# Patient Record
Sex: FEMALE | Race: BLACK OR AFRICAN AMERICAN | NOT HISPANIC OR LATINO | URBAN - METROPOLITAN AREA
[De-identification: names, ages, dates, MRNs, and addresses within clinical notes are randomized per-mention and may not be internally consistent; named-entity substitution may affect disease eponyms.]

---

## 2017-09-23 ENCOUNTER — EMERGENCY (EMERGENCY)
Facility: HOSPITAL | Age: 62
LOS: 0 days | Discharge: HOME | End: 2017-09-23
Attending: EMERGENCY MEDICINE
Payer: SELF-PAY

## 2017-09-23 VITALS
SYSTOLIC BLOOD PRESSURE: 118 MMHG | DIASTOLIC BLOOD PRESSURE: 119 MMHG | OXYGEN SATURATION: 97 % | TEMPERATURE: 98 F | HEART RATE: 90 BPM | HEIGHT: 66 IN | WEIGHT: 179.02 LBS | RESPIRATION RATE: 17 BRPM

## 2017-09-23 DIAGNOSIS — R73.9 HYPERGLYCEMIA, UNSPECIFIED: ICD-10-CM

## 2017-09-23 DIAGNOSIS — I10 ESSENTIAL (PRIMARY) HYPERTENSION: ICD-10-CM

## 2017-09-23 DIAGNOSIS — Z91.14 PATIENT'S OTHER NONCOMPLIANCE WITH MEDICATION REGIMEN: ICD-10-CM

## 2017-09-23 DIAGNOSIS — N39.0 URINARY TRACT INFECTION, SITE NOT SPECIFIED: ICD-10-CM

## 2017-09-23 DIAGNOSIS — R53.1 WEAKNESS: ICD-10-CM

## 2017-09-23 LAB
ACETONE SERPL-MCNC: NEGATIVE — SIGNIFICANT CHANGE UP
ALBUMIN SERPL ELPH-MCNC: 4 G/DL — SIGNIFICANT CHANGE UP (ref 3.3–5)
ALP SERPL-CCNC: 124 U/L — HIGH (ref 40–120)
ALT FLD-CCNC: 31 U/L — SIGNIFICANT CHANGE UP (ref 12–78)
ANION GAP SERPL CALC-SCNC: 9 MMOL/L — SIGNIFICANT CHANGE UP (ref 5–17)
APPEARANCE UR: ABNORMAL
AST SERPL-CCNC: 17 U/L — SIGNIFICANT CHANGE UP (ref 15–37)
BASE EXCESS BLDA CALC-SCNC: 2.1 MMOL/L — HIGH (ref -2–2)
BILIRUB SERPL-MCNC: 0.8 MG/DL — SIGNIFICANT CHANGE UP (ref 0.2–1.2)
BILIRUB UR-MCNC: NEGATIVE — SIGNIFICANT CHANGE UP
BLOOD GAS COMMENTS: SIGNIFICANT CHANGE UP
BLOOD GAS COMMENTS: SIGNIFICANT CHANGE UP
BLOOD GAS SOURCE: SIGNIFICANT CHANGE UP
BUN SERPL-MCNC: 12 MG/DL — SIGNIFICANT CHANGE UP (ref 7–23)
CALCIUM SERPL-MCNC: 9.5 MG/DL — SIGNIFICANT CHANGE UP (ref 8.5–10.1)
CHLORIDE SERPL-SCNC: 102 MMOL/L — SIGNIFICANT CHANGE UP (ref 96–108)
CO2 SERPL-SCNC: 28 MMOL/L — SIGNIFICANT CHANGE UP (ref 22–31)
COLOR SPEC: YELLOW — SIGNIFICANT CHANGE UP
CREAT SERPL-MCNC: 0.89 MG/DL — SIGNIFICANT CHANGE UP (ref 0.5–1.3)
DIFF PNL FLD: ABNORMAL
GLUCOSE SERPL-MCNC: 326 MG/DL — HIGH (ref 70–99)
GLUCOSE UR QL: 1000 MG/DL
HCO3 BLDA-SCNC: 26 MMOL/L — SIGNIFICANT CHANGE UP (ref 21–29)
HCT VFR BLD CALC: 45.8 % — HIGH (ref 34.5–45)
HGB BLD-MCNC: 14.5 G/DL — SIGNIFICANT CHANGE UP (ref 11.5–15.5)
HOROWITZ INDEX BLDA+IHG-RTO: 21 — SIGNIFICANT CHANGE UP
KETONES UR-MCNC: NEGATIVE — SIGNIFICANT CHANGE UP
LEUKOCYTE ESTERASE UR-ACNC: ABNORMAL
MCHC RBC-ENTMCNC: 26.1 PG — LOW (ref 27–34)
MCHC RBC-ENTMCNC: 31.6 GM/DL — LOW (ref 32–36)
MCV RBC AUTO: 82.7 FL — SIGNIFICANT CHANGE UP (ref 80–100)
NITRITE UR-MCNC: NEGATIVE — SIGNIFICANT CHANGE UP
PCO2 BLDA: 38 MMHG — SIGNIFICANT CHANGE UP (ref 32–46)
PH BLD: 7.44 — SIGNIFICANT CHANGE UP (ref 7.35–7.45)
PH UR: 5 — SIGNIFICANT CHANGE UP (ref 5–8)
PLATELET # BLD AUTO: 297 K/UL — SIGNIFICANT CHANGE UP (ref 150–400)
PO2 BLDA: 82 MMHG — SIGNIFICANT CHANGE UP (ref 74–108)
POTASSIUM SERPL-MCNC: 4.1 MMOL/L — SIGNIFICANT CHANGE UP (ref 3.5–5.3)
POTASSIUM SERPL-SCNC: 4.1 MMOL/L — SIGNIFICANT CHANGE UP (ref 3.5–5.3)
PROT SERPL-MCNC: 8.2 GM/DL — SIGNIFICANT CHANGE UP (ref 6–8.3)
PROT UR-MCNC: NEGATIVE MG/DL — SIGNIFICANT CHANGE UP
RBC # BLD: 5.54 M/UL — HIGH (ref 3.8–5.2)
RBC # FLD: 13.5 % — SIGNIFICANT CHANGE UP (ref 11–15)
SAO2 % BLDA: 97 % — HIGH (ref 92–96)
SODIUM SERPL-SCNC: 139 MMOL/L — SIGNIFICANT CHANGE UP (ref 135–145)
SP GR SPEC: 1.01 — SIGNIFICANT CHANGE UP (ref 1.01–1.02)
UROBILINOGEN FLD QL: NEGATIVE MG/DL — SIGNIFICANT CHANGE UP
WBC # BLD: 7 K/UL — SIGNIFICANT CHANGE UP (ref 3.8–10.5)
WBC # FLD AUTO: 7 K/UL — SIGNIFICANT CHANGE UP (ref 3.8–10.5)

## 2017-09-23 PROCEDURE — 99284 EMERGENCY DEPT VISIT MOD MDM: CPT

## 2017-09-23 RX ORDER — METFORMIN HYDROCHLORIDE 850 MG/1
1 TABLET ORAL
Qty: 14 | Refills: 0 | OUTPATIENT
Start: 2017-09-23 | End: 2017-09-30

## 2017-09-23 RX ORDER — SODIUM CHLORIDE 9 MG/ML
2000 INJECTION INTRAMUSCULAR; INTRAVENOUS; SUBCUTANEOUS ONCE
Qty: 0 | Refills: 0 | Status: COMPLETED | OUTPATIENT
Start: 2017-09-23 | End: 2017-09-23

## 2017-09-23 RX ORDER — AZTREONAM 2 G
1 VIAL (EA) INJECTION
Qty: 14 | Refills: 0 | OUTPATIENT
Start: 2017-09-23 | End: 2017-09-30

## 2017-09-23 RX ADMIN — Medication 1 TABLET(S): at 18:04

## 2017-09-23 RX ADMIN — SODIUM CHLORIDE 2000 MILLILITER(S): 9 INJECTION INTRAMUSCULAR; INTRAVENOUS; SUBCUTANEOUS at 15:56

## 2017-09-23 NOTE — ED PROVIDER NOTE - PROGRESS NOTE DETAILS
Results reported to patient--hyperglycemia, UTI, sent meds to pharmacy  Pt. reports feeling better after meds  pt. agrees to f/u with primary care outpt.  pt. understands to return to ED if symptoms worsen; will d/c

## 2017-09-23 NOTE — ED PROVIDER NOTE - PHYSICAL EXAMINATION
Vitals: WNL  Gen: AAOx3, NAD, sitting comfortably in stretcher, non toxic  Head: ncat, perrla, eomi b/l  Neck: supple, no lymphadenopathy, no midline deviation  Heart: rrr, no m/r/g  Lungs: CTA b/l, no rales/ronchi/wheezes  Abd: soft, nontender, non-distended, no rebound or guarding  Ext: no clubbing/cyanosis/edema  Neuro: sensation and muscle strength intact b/l, steady gait

## 2017-09-23 NOTE — ED PROVIDER NOTE - MEDICAL DECISION MAKING DETAILS
62 yo F with hyperglycemia, r/o DKA  -basic labs, ekg, finger sticks hourly, NS bolus 2L, abg, ketones, ua ,cx  -f/u results, insulin as needed when labs return

## 2017-09-23 NOTE — ED PROVIDER NOTE - OBJECTIVE STATEMENT
60 yo F with tiredness and weakness.  Her son is an EMS driver and decided to check her sugar today and it was 450 at home.  Pt. has no complaints.  Son explains she's in denial about her medical problems.  She's also been told she has HTN and HLD, but she's non-compliant with meds.  When asked, she admits to frequency.   ROS: negative for fever, cough, headache, chest pain, shortness of breath, abd pain, nausea, vomiting, diarrhea, rash, paresthesia, and weakness.   PMH: HTN, HLD; Meds: Denies; SH: Denies smoking/drinking/drug use

## 2017-09-23 NOTE — ED ADULT NURSE NOTE - OBJECTIVE STATEMENT
Patient alert, stating that she took her blood sugar 458 at home before she ate, also her blood pressure was high at home, above 160. Stating that she has been urinating frequently, that she is thirsty all the time, and that her vision is blurry. Patient states she feels like her vaginal area is itchy too. Son at bedside. Patient states she lost a lot of weight recently. Patient alert, stating that she took her blood sugar 458 at home before she ate, also her blood pressure was high at home, above 160. Stating that she has been urinating frequently, that she is thirsty all the time, and that her vision is blurry. Patient states she feels like her vaginal area is itchy too. Son at bedside. Patient states she lost a lot of weight recently. Takes no medication at home, has no prior medical or surgical history as per patient.

## 2017-09-24 LAB
CULTURE RESULTS: SIGNIFICANT CHANGE UP
SPECIMEN SOURCE: SIGNIFICANT CHANGE UP

## 2019-06-23 ENCOUNTER — EMERGENCY (EMERGENCY)
Facility: HOSPITAL | Age: 64
LOS: 1 days | Discharge: HOME | End: 2019-06-23
Attending: EMERGENCY MEDICINE | Admitting: EMERGENCY MEDICINE
Payer: MEDICAID

## 2019-06-23 VITALS
HEART RATE: 84 BPM | RESPIRATION RATE: 18 BRPM | OXYGEN SATURATION: 100 % | DIASTOLIC BLOOD PRESSURE: 86 MMHG | SYSTOLIC BLOOD PRESSURE: 149 MMHG | TEMPERATURE: 99 F

## 2019-06-23 LAB
ALBUMIN SERPL ELPH-MCNC: 4.4 G/DL — SIGNIFICANT CHANGE UP (ref 3.3–5)
ALP SERPL-CCNC: 97 U/L — SIGNIFICANT CHANGE UP (ref 40–120)
ALT FLD-CCNC: 18 U/L — SIGNIFICANT CHANGE UP (ref 4–33)
ANION GAP SERPL CALC-SCNC: 13 MMO/L — SIGNIFICANT CHANGE UP (ref 7–14)
APPEARANCE UR: CLEAR — SIGNIFICANT CHANGE UP
AST SERPL-CCNC: 10 U/L — SIGNIFICANT CHANGE UP (ref 4–32)
B-OH-BUTYR SERPL-SCNC: 0 MMOL/L — SIGNIFICANT CHANGE UP (ref 0–0.4)
BASE EXCESS BLDV CALC-SCNC: 3.5 MMOL/L — SIGNIFICANT CHANGE UP
BASOPHILS # BLD AUTO: 0.03 K/UL — SIGNIFICANT CHANGE UP (ref 0–0.2)
BASOPHILS NFR BLD AUTO: 0.4 % — SIGNIFICANT CHANGE UP (ref 0–2)
BILIRUB SERPL-MCNC: 0.4 MG/DL — SIGNIFICANT CHANGE UP (ref 0.2–1.2)
BILIRUB UR-MCNC: NEGATIVE — SIGNIFICANT CHANGE UP
BLOOD GAS VENOUS - CREATININE: 0.67 MG/DL — SIGNIFICANT CHANGE UP (ref 0.5–1.3)
BLOOD GAS VENOUS - FIO2: 21 — SIGNIFICANT CHANGE UP
BLOOD UR QL VISUAL: NEGATIVE — SIGNIFICANT CHANGE UP
BUN SERPL-MCNC: 14 MG/DL — SIGNIFICANT CHANGE UP (ref 7–23)
CALCIUM SERPL-MCNC: 10.2 MG/DL — SIGNIFICANT CHANGE UP (ref 8.4–10.5)
CHLORIDE BLDV-SCNC: 104 MMOL/L — SIGNIFICANT CHANGE UP (ref 96–108)
CHLORIDE SERPL-SCNC: 98 MMOL/L — SIGNIFICANT CHANGE UP (ref 98–107)
CO2 SERPL-SCNC: 25 MMOL/L — SIGNIFICANT CHANGE UP (ref 22–31)
COLOR SPEC: SIGNIFICANT CHANGE UP
CREAT SERPL-MCNC: 0.66 MG/DL — SIGNIFICANT CHANGE UP (ref 0.5–1.3)
EOSINOPHIL # BLD AUTO: 0.05 K/UL — SIGNIFICANT CHANGE UP (ref 0–0.5)
EOSINOPHIL NFR BLD AUTO: 0.6 % — SIGNIFICANT CHANGE UP (ref 0–6)
GAS PNL BLDV: 135 MMOL/L — LOW (ref 136–146)
GLUCOSE BLDV-MCNC: 484 MG/DL — CRITICAL HIGH (ref 70–99)
GLUCOSE SERPL-MCNC: 523 MG/DL — CRITICAL HIGH (ref 70–99)
GLUCOSE UR-MCNC: >1000 — HIGH
HBA1C BLD-MCNC: 11.3 % — HIGH (ref 4–5.6)
HCO3 BLDV-SCNC: 26 MMOL/L — SIGNIFICANT CHANGE UP (ref 20–27)
HCT VFR BLD CALC: 41.5 % — SIGNIFICANT CHANGE UP (ref 34.5–45)
HCT VFR BLDV CALC: 41.3 % — SIGNIFICANT CHANGE UP (ref 34.5–45)
HGB BLD-MCNC: 12.9 G/DL — SIGNIFICANT CHANGE UP (ref 11.5–15.5)
HGB BLDV-MCNC: 13.4 G/DL — SIGNIFICANT CHANGE UP (ref 11.5–15.5)
IMM GRANULOCYTES NFR BLD AUTO: 0.5 % — SIGNIFICANT CHANGE UP (ref 0–1.5)
KETONES UR-MCNC: NEGATIVE — SIGNIFICANT CHANGE UP
LACTATE BLDV-MCNC: 2 MMOL/L — SIGNIFICANT CHANGE UP (ref 0.5–2)
LEUKOCYTE ESTERASE UR-ACNC: NEGATIVE — SIGNIFICANT CHANGE UP
LYMPHOCYTES # BLD AUTO: 2.07 K/UL — SIGNIFICANT CHANGE UP (ref 1–3.3)
LYMPHOCYTES # BLD AUTO: 26.1 % — SIGNIFICANT CHANGE UP (ref 13–44)
MCHC RBC-ENTMCNC: 25.2 PG — LOW (ref 27–34)
MCHC RBC-ENTMCNC: 31.1 % — LOW (ref 32–36)
MCV RBC AUTO: 81.2 FL — SIGNIFICANT CHANGE UP (ref 80–100)
MONOCYTES # BLD AUTO: 0.42 K/UL — SIGNIFICANT CHANGE UP (ref 0–0.9)
MONOCYTES NFR BLD AUTO: 5.3 % — SIGNIFICANT CHANGE UP (ref 2–14)
NEUTROPHILS # BLD AUTO: 5.31 K/UL — SIGNIFICANT CHANGE UP (ref 1.8–7.4)
NEUTROPHILS NFR BLD AUTO: 67.1 % — SIGNIFICANT CHANGE UP (ref 43–77)
NITRITE UR-MCNC: NEGATIVE — SIGNIFICANT CHANGE UP
NRBC # FLD: 0 K/UL — SIGNIFICANT CHANGE UP (ref 0–0)
PCO2 BLDV: 50 MMHG — SIGNIFICANT CHANGE UP (ref 41–51)
PH BLDV: 7.37 PH — SIGNIFICANT CHANGE UP (ref 7.32–7.43)
PH UR: 7 — SIGNIFICANT CHANGE UP (ref 5–8)
PLATELET # BLD AUTO: 292 K/UL — SIGNIFICANT CHANGE UP (ref 150–400)
PMV BLD: 12.7 FL — SIGNIFICANT CHANGE UP (ref 7–13)
PO2 BLDV: 35 MMHG — SIGNIFICANT CHANGE UP (ref 35–40)
POTASSIUM BLDV-SCNC: 4.1 MMOL/L — SIGNIFICANT CHANGE UP (ref 3.4–4.5)
POTASSIUM SERPL-MCNC: 4.3 MMOL/L — SIGNIFICANT CHANGE UP (ref 3.5–5.3)
POTASSIUM SERPL-SCNC: 4.3 MMOL/L — SIGNIFICANT CHANGE UP (ref 3.5–5.3)
PROT SERPL-MCNC: 7.5 G/DL — SIGNIFICANT CHANGE UP (ref 6–8.3)
PROT UR-MCNC: NEGATIVE — SIGNIFICANT CHANGE UP
RBC # BLD: 5.11 M/UL — SIGNIFICANT CHANGE UP (ref 3.8–5.2)
RBC # FLD: 13.5 % — SIGNIFICANT CHANGE UP (ref 10.3–14.5)
SAO2 % BLDV: 62.9 % — SIGNIFICANT CHANGE UP (ref 60–85)
SODIUM SERPL-SCNC: 136 MMOL/L — SIGNIFICANT CHANGE UP (ref 135–145)
SP GR SPEC: 1.03 — SIGNIFICANT CHANGE UP (ref 1–1.04)
UROBILINOGEN FLD QL: NORMAL — SIGNIFICANT CHANGE UP
WBC # BLD: 7.92 K/UL — SIGNIFICANT CHANGE UP (ref 3.8–10.5)
WBC # FLD AUTO: 7.92 K/UL — SIGNIFICANT CHANGE UP (ref 3.8–10.5)

## 2019-06-23 PROCEDURE — 99220: CPT

## 2019-06-23 RX ORDER — DEXTROSE 50 % IN WATER 50 %
15 SYRINGE (ML) INTRAVENOUS ONCE
Refills: 0 | Status: DISCONTINUED | OUTPATIENT
Start: 2019-06-23 | End: 2019-06-27

## 2019-06-23 RX ORDER — DEXTROSE 50 % IN WATER 50 %
12.5 SYRINGE (ML) INTRAVENOUS ONCE
Refills: 0 | Status: DISCONTINUED | OUTPATIENT
Start: 2019-06-23 | End: 2019-06-27

## 2019-06-23 RX ORDER — INSULIN LISPRO 100/ML
VIAL (ML) SUBCUTANEOUS
Refills: 0 | Status: DISCONTINUED | OUTPATIENT
Start: 2019-06-23 | End: 2019-06-27

## 2019-06-23 RX ORDER — INSULIN LISPRO 100/ML
6 VIAL (ML) SUBCUTANEOUS
Refills: 0 | Status: DISCONTINUED | OUTPATIENT
Start: 2019-06-23 | End: 2019-06-27

## 2019-06-23 RX ORDER — DEXTROSE 50 % IN WATER 50 %
25 SYRINGE (ML) INTRAVENOUS ONCE
Refills: 0 | Status: DISCONTINUED | OUTPATIENT
Start: 2019-06-23 | End: 2019-06-27

## 2019-06-23 RX ORDER — LOSARTAN POTASSIUM 100 MG/1
50 TABLET, FILM COATED ORAL DAILY
Refills: 0 | Status: DISCONTINUED | OUTPATIENT
Start: 2019-06-23 | End: 2019-06-27

## 2019-06-23 RX ORDER — SODIUM CHLORIDE 9 MG/ML
1000 INJECTION INTRAMUSCULAR; INTRAVENOUS; SUBCUTANEOUS ONCE
Refills: 0 | Status: COMPLETED | OUTPATIENT
Start: 2019-06-23 | End: 2019-06-23

## 2019-06-23 RX ORDER — SODIUM CHLORIDE 9 MG/ML
1000 INJECTION, SOLUTION INTRAVENOUS
Refills: 0 | Status: DISCONTINUED | OUTPATIENT
Start: 2019-06-23 | End: 2019-06-27

## 2019-06-23 RX ORDER — METFORMIN HYDROCHLORIDE 850 MG/1
1000 TABLET ORAL
Refills: 0 | Status: DISCONTINUED | OUTPATIENT
Start: 2019-06-23 | End: 2019-06-27

## 2019-06-23 RX ORDER — INSULIN LISPRO 100/ML
VIAL (ML) SUBCUTANEOUS AT BEDTIME
Refills: 0 | Status: DISCONTINUED | OUTPATIENT
Start: 2019-06-23 | End: 2019-06-27

## 2019-06-23 RX ORDER — GLUCAGON INJECTION, SOLUTION 0.5 MG/.1ML
1 INJECTION, SOLUTION SUBCUTANEOUS ONCE
Refills: 0 | Status: DISCONTINUED | OUTPATIENT
Start: 2019-06-23 | End: 2019-06-27

## 2019-06-23 RX ORDER — INSULIN GLARGINE 100 [IU]/ML
18 INJECTION, SOLUTION SUBCUTANEOUS AT BEDTIME
Refills: 0 | Status: DISCONTINUED | OUTPATIENT
Start: 2019-06-23 | End: 2019-06-27

## 2019-06-23 RX ORDER — SODIUM CHLORIDE 9 MG/ML
1000 INJECTION INTRAMUSCULAR; INTRAVENOUS; SUBCUTANEOUS
Refills: 0 | Status: DISCONTINUED | OUTPATIENT
Start: 2019-06-23 | End: 2019-06-27

## 2019-06-23 RX ORDER — SIMVASTATIN 20 MG/1
20 TABLET, FILM COATED ORAL AT BEDTIME
Refills: 0 | Status: DISCONTINUED | OUTPATIENT
Start: 2019-06-23 | End: 2019-06-27

## 2019-06-23 RX ADMIN — Medication 3: at 18:29

## 2019-06-23 RX ADMIN — LOSARTAN POTASSIUM 50 MILLIGRAM(S): 100 TABLET, FILM COATED ORAL at 18:28

## 2019-06-23 RX ADMIN — Medication 1 APPLICATION(S): at 18:58

## 2019-06-23 RX ADMIN — SIMVASTATIN 20 MILLIGRAM(S): 20 TABLET, FILM COATED ORAL at 22:04

## 2019-06-23 RX ADMIN — Medication 6 UNIT(S): at 18:30

## 2019-06-23 RX ADMIN — INSULIN GLARGINE 18 UNIT(S): 100 INJECTION, SOLUTION SUBCUTANEOUS at 22:04

## 2019-06-23 RX ADMIN — SODIUM CHLORIDE 100 MILLILITER(S): 9 INJECTION INTRAMUSCULAR; INTRAVENOUS; SUBCUTANEOUS at 18:30

## 2019-06-23 RX ADMIN — METFORMIN HYDROCHLORIDE 1000 MILLIGRAM(S): 850 TABLET ORAL at 18:28

## 2019-06-23 RX ADMIN — SODIUM CHLORIDE 1000 MILLILITER(S): 9 INJECTION INTRAMUSCULAR; INTRAVENOUS; SUBCUTANEOUS at 15:14

## 2019-06-23 RX ADMIN — SODIUM CHLORIDE 1000 MILLILITER(S): 9 INJECTION INTRAMUSCULAR; INTRAVENOUS; SUBCUTANEOUS at 16:47

## 2019-06-23 NOTE — ED PROVIDER NOTE - OBJECTIVE STATEMENT
62 yo F c PMH of IDDM, HTN, HLD p/w 2 week h/o of polyuria, took blood sugar was in 400s today, states she is non-compliant with DM meds "because sometimes I forget." home meds: metformin 1000 mg bid, glargine 30 u qhs.

## 2019-06-23 NOTE — ED PROVIDER NOTE - ATTENDING CONTRIBUTION TO CARE
Attending Statement: I have personally seen and examined this patient. I have fully participated in the care of this patient. I have reviewed all pertinent clinical information, including history physical exam, plan and the Resident's note and agree except as noted  62yo F hx of IDDM, HTN, HLD, pw polyuria and "not feeling ok" pt admits to being non adherent to meds for DM, pt prescribed metformin and glargine. FS has been "over 400" last two days. Denies polydipsia Denies cp/sob/abdominal pain. no vomit or diarrhea no dysuria or hematuria no back pain  no fever  Vital signs noted. sitting in chair no distress. EOMI. mmm. normal S1-S2 No resp distress. able to speak in full and clear sentences. no wheeze, rales or stridor. soft nontender abdomen. no  rebound. no guarding. no sign of trauma. no CVAT no pedal edema. no calf tenderness. normal pulses bilateral feet.   plan labs, ekg, urine, CDU/admissioin

## 2019-06-23 NOTE — ED CDU PROVIDER INITIAL DAY NOTE - PHYSICAL EXAMINATION
Vital signs reviewed.   CONSTITUTIONAL: Well-appearing; well-nourished; in no apparent distress. Non-toxic appearing.   HEAD: Normocephalic, atraumatic.  EYES: PERRL, EOM intact, conjunctiva and sclera WNL.  ENT: normal nose; no rhinorrhea;   NECK: Trachea midline   CARD: Normal S1, S2; no murmurs, rubs, or gallops noted.  RESP: Normal chest excursion with respiration; breath sounds clear and equal bilaterally; no wheezes, rhonchi, or rales.  EXT/MS: moves all extremities; distal pulses are normal, no pedal edema.  SKIN: Normal for age and race; warm; dry; good turgor; +b/l candidal appearing rash noted underneath both breast.   NEURO: Awake, alert, oriented x 3, no gross deficits, CN II-XII grossly intact, no motor or sensory deficit noted. No pronator drift. No slurred speech or facial droop.   PSYCH: Normal mood; appropriate affect.

## 2019-06-23 NOTE — ED PROVIDER NOTE - CLINICAL SUMMARY MEDICAL DECISION MAKING FREE TEXT BOX
64 yo F c PMH of IDDM, HTN, HLD p/w 2 week h/o of polyuria, took blood sugar was in 400s today, states she is non-compliant with DM meds "because sometimes I forget." On exam, /83 VS otherwise wnl, no remarkable exam findings. Hyperglycemia to 500s downtrending s/p IVF. pt states she is willing to go to CDU to learn how to use insulin. will observe to cdu for DM education A1c 11

## 2019-06-23 NOTE — ED CDU PROVIDER INITIAL DAY NOTE - OBJECTIVE STATEMENT
HPI: Patient is a 63 y.o female with PMHx of HTN, HLD, DM (on metformin, glimeperide, Basaglar) who presents to ED c/o dizziness/feeling lightheaded over past week, noted elevated BG at home in 400's. As per patient states that over the past week she has intermittent dizziness described as room spinning, admits today she was with her son and checked FS at home and noted BG to being in 400's. Pt admits she was started on Basaglar 1-2 years ago, suppose to give 30 units at bedtime. Pt also on PO metformin and amaryl. Pt admits that her current insulin is  and that she sometimes misses her dose of medications. Pt recently seen at [facility] and was set up with Endocrinology outpatient appointment for  with Dr. Worrell. Pt states currently BG being monitored by PCP Dr. Dennis Mario. Pt denies changes in vision, headaches, Pt seen and worked up in ED,  and FS in 400's. Pt given fluids with BG trending to 300's. HA1C 11.3. Pt Transferred to CDU for; BG monitoring, ISS, fluids, Endo consult, vitals q4 and frequent re-eval. HPI: Patient is a 63 y.o female with PMHx of HTN, HLD, DM (on metformin, glimeperide, Basaglar) who presents to ED c/o dizziness/feeling lightheaded over past week, noted elevated BG at home in 400's. As per patient states that over the past week she has intermittent dizziness described as room spinning, admits today she was with her son and checked FS at home and noted BG to being in 400's. Pt admits she was started on Basaglar 1-2 years ago, suppose to give 30 units at bedtime. Pt also on PO metformin and amaryl. Pt admits that her current insulin is  and that she sometimes misses her dose of medications. Pt recently seen at [facility] and was set up with Endocrinology outpatient appointment for  with Dr. Worrell. Pt states currently BG being monitored by PCP Dr. Dennis Mario. Pt denies changes in vision, headaches, chest pain, shortness of breath, numbness or tingling, weakness, neck pain, fever, or any other complaints. Pt seen and worked up in ED,  and FS in 400's. Pt given fluids with BG trending to 300's. HA1C 11.3. Pt Transferred to CDU for; BG monitoring, ISS, fluids, Endo consult, vitals q4 and frequent re-eval.

## 2019-06-23 NOTE — ED ADULT NURSE NOTE - OBJECTIVE STATEMENT
Pt presents to ED with dizziness, weakness, high blood sugar, and urinary frequency. Pt AxOx3, ambulatory. Pt denies chest pain, lightheadedness and shortness of breath. breathing even and unlabored. Pt states she took her blood sugar and saw it was in the 400's. Pt states she has been feeling very thirsty and always has to go to the bathroom. pt noncomplaint with insulin. pt states she takes 30units at night but does not always take it because she does not like to take her blood sugar. Pt denies abd pain, n/v/d. Pt skin clean dry and intact. 20G IVL placed in the R AC. Will continue to monitor.

## 2019-06-23 NOTE — ED ADULT TRIAGE NOTE - CHIEF COMPLAINT QUOTE
reports increased tiredness,increased urination x 4 days.fs over 400 today. not taking meds regularly   fs 475

## 2019-06-23 NOTE — ED CDU PROVIDER INITIAL DAY NOTE - DETAILS
Patient is a 63 y.o female with PMHx of HTN, HLD, DM (on metformin, glimeperide, lantus) who presents to ED c/o dizziness/feeling lightheaded over past week, noted elevated BG at home in 400's. Pt seen and worked up in ED,  and FS in 400's. Pt given fluids with BG trending to 300's. HA1C 11.3. Pt Transferred to CDU for; BG monitoring, ISS, fluids, Endo consult, vitals q4 and frequent re-eval.

## 2019-06-23 NOTE — ED CDU PROVIDER INITIAL DAY NOTE - ATTENDING CONTRIBUTION TO CARE
Dr Bloch, ATTENDING MD-  I performed a face to face bedside interview with patient regarding history of present illness, review of symptoms and past medical history. I completed an independent physical exam.  I have discussed patient's plan of care with PA.   Documentation as above in the note.  Patient well appearing obese female NAD HEENT nml heart sounds nml lungs clear, abd soft nontender, no edema, pulses present. Neuro exam nonfocal , alert and oriented, motor , sensory, gait intact

## 2019-06-23 NOTE — ED ADULT NURSE NOTE - NSIMPLEMENTINTERV_GEN_ALL_ED
Implemented All Universal Safety Interventions:  [place] to call system. Call bell, personal items and telephone within reach. Instruct patient to call for assistance. Room bathroom lighting operational. Non-slip footwear when patient is off stretcher. Physically safe environment: no spills, clutter or unnecessary equipment. Stretcher in lowest position, wheels locked, appropriate side rails in place.

## 2019-06-24 VITALS
OXYGEN SATURATION: 100 % | HEART RATE: 86 BPM | RESPIRATION RATE: 16 BRPM | TEMPERATURE: 98 F | DIASTOLIC BLOOD PRESSURE: 84 MMHG | SYSTOLIC BLOOD PRESSURE: 168 MMHG

## 2019-06-24 DIAGNOSIS — I10 ESSENTIAL (PRIMARY) HYPERTENSION: ICD-10-CM

## 2019-06-24 DIAGNOSIS — E78.5 HYPERLIPIDEMIA, UNSPECIFIED: ICD-10-CM

## 2019-06-24 DIAGNOSIS — E11.65 TYPE 2 DIABETES MELLITUS WITH HYPERGLYCEMIA: ICD-10-CM

## 2019-06-24 LAB
ANION GAP SERPL CALC-SCNC: 9 MMO/L — SIGNIFICANT CHANGE UP (ref 7–14)
BASE EXCESS BLDV CALC-SCNC: 2.9 MMOL/L — SIGNIFICANT CHANGE UP
BLOOD GAS VENOUS - CREATININE: 0.64 MG/DL — SIGNIFICANT CHANGE UP (ref 0.5–1.3)
BLOOD GAS VENOUS - FIO2: 21 — SIGNIFICANT CHANGE UP
BUN SERPL-MCNC: 10 MG/DL — SIGNIFICANT CHANGE UP (ref 7–23)
CALCIUM SERPL-MCNC: 8.8 MG/DL — SIGNIFICANT CHANGE UP (ref 8.4–10.5)
CHLORIDE BLDV-SCNC: 112 MMOL/L — HIGH (ref 96–108)
CHLORIDE SERPL-SCNC: 108 MMOL/L — HIGH (ref 98–107)
CHOLEST SERPL-MCNC: 129 MG/DL — SIGNIFICANT CHANGE UP (ref 120–199)
CO2 SERPL-SCNC: 27 MMOL/L — SIGNIFICANT CHANGE UP (ref 22–31)
CREAT SERPL-MCNC: 0.61 MG/DL — SIGNIFICANT CHANGE UP (ref 0.5–1.3)
GAS PNL BLDV: 138 MMOL/L — SIGNIFICANT CHANGE UP (ref 136–146)
GLUCOSE BLDV-MCNC: 138 MG/DL — HIGH (ref 70–99)
GLUCOSE SERPL-MCNC: 149 MG/DL — HIGH (ref 70–99)
HCO3 BLDV-SCNC: 26 MMOL/L — SIGNIFICANT CHANGE UP (ref 20–27)
HCT VFR BLDV CALC: 37.5 % — SIGNIFICANT CHANGE UP (ref 34.5–45)
HDLC SERPL-MCNC: 42 MG/DL — LOW (ref 45–65)
HGB BLDV-MCNC: 12.2 G/DL — SIGNIFICANT CHANGE UP (ref 11.5–15.5)
LACTATE BLDV-MCNC: 1.4 MMOL/L — SIGNIFICANT CHANGE UP (ref 0.5–2)
LIPID PNL WITH DIRECT LDL SERPL: 83 MG/DL — SIGNIFICANT CHANGE UP
PCO2 BLDV: 49 MMHG — SIGNIFICANT CHANGE UP (ref 41–51)
PH BLDV: 7.37 PH — SIGNIFICANT CHANGE UP (ref 7.32–7.43)
PO2 BLDV: 35 MMHG — SIGNIFICANT CHANGE UP (ref 35–40)
POTASSIUM BLDV-SCNC: 3.5 MMOL/L — SIGNIFICANT CHANGE UP (ref 3.4–4.5)
POTASSIUM SERPL-MCNC: 3.8 MMOL/L — SIGNIFICANT CHANGE UP (ref 3.5–5.3)
POTASSIUM SERPL-SCNC: 3.8 MMOL/L — SIGNIFICANT CHANGE UP (ref 3.5–5.3)
SAO2 % BLDV: 62.5 % — SIGNIFICANT CHANGE UP (ref 60–85)
SODIUM SERPL-SCNC: 144 MMOL/L — SIGNIFICANT CHANGE UP (ref 135–145)
TRIGL SERPL-MCNC: 81 MG/DL — SIGNIFICANT CHANGE UP (ref 10–149)

## 2019-06-24 PROCEDURE — 99217: CPT

## 2019-06-24 PROCEDURE — 99284 EMERGENCY DEPT VISIT MOD MDM: CPT

## 2019-06-24 RX ORDER — INSULIN ASPART 100 [IU]/ML
14 INJECTION, SUSPENSION SUBCUTANEOUS
Qty: 5 | Refills: 0
Start: 2019-06-24 | End: 2019-07-23

## 2019-06-24 RX ORDER — INSULIN ASPART 100 [IU]/ML
26 INJECTION, SUSPENSION SUBCUTANEOUS
Qty: 5 | Refills: 0
Start: 2019-06-24

## 2019-06-24 RX ORDER — METFORMIN HYDROCHLORIDE 850 MG/1
1 TABLET ORAL
Qty: 60 | Refills: 0
Start: 2019-06-24 | End: 2019-07-23

## 2019-06-24 RX ADMIN — LOSARTAN POTASSIUM 50 MILLIGRAM(S): 100 TABLET, FILM COATED ORAL at 06:05

## 2019-06-24 RX ADMIN — Medication 6 UNIT(S): at 13:26

## 2019-06-24 RX ADMIN — Medication 1 APPLICATION(S): at 06:05

## 2019-06-24 RX ADMIN — METFORMIN HYDROCHLORIDE 1000 MILLIGRAM(S): 850 TABLET ORAL at 10:32

## 2019-06-24 RX ADMIN — Medication 6 UNIT(S): at 09:23

## 2019-06-24 RX ADMIN — Medication 2: at 13:25

## 2019-06-24 NOTE — CONSULT NOTE ADULT - PROBLEM SELECTOR RECOMMENDATION 9
- Goal A1c is 6.5% and FS  at home  - Recommend Lantus 18 and Humalog 6 TID AC and low scales INPATIENT  - Recommend RD consultation    Outpatient Plan  - Patient admits to non adherence to meds/insulin due to cumbersome schedule  - Given uncontrolled FS and high A1c, she needs basal/mealtime insulin but unable to do 4 insulin injections daily due to workload, burden of injections and FS  - Recommend Metformin 1000 mg BID  - Recommend Novolog 70/30 OR Humalog 75/25 -- 26 units before breakfast and 14 units before dinner. Advised patient to check FS before each insulin injection  - Recommend D/C Basaglar and Glimepiride  - We had a lengthy discussion regarding consistent carb diet and eliminating juice unless FS<70 when she must drink only 1/2 cup juice  - F/u with PMD and Dr. Worrell as scheduled  - Advised to stay well hydrated

## 2019-06-24 NOTE — ED CDU PROVIDER DISPOSITION NOTE - NSFOLLOWUPINSTRUCTIONS_ED_ALL_ED_FT
Follow up with your Primary Medical Doctor within 2-3days. If results or reports were given to you, show copies of your reports given to you. Take all of your medications as previously prescribed.   If you have issues obtaining follow up, please call: [phone] to obtain a doctor or specialist who takes your insurance in your area.  Follow up with Dr. Worrell as scheduled for August 29th.  *** STOP taking Basaglar and Glimepiride	    - Recommend Taking Metformin 1000 mg twice a day.  - Recommend Novolog 70/30--- 26 units before breakfast and 14 units before dinner.   - Check Finger Stick before each insulin injection  - Stay well hydrated.    If any new, concerning, worsening symptoms return to the ED.

## 2019-06-24 NOTE — CONSULT NOTE ADULT - ASSESSMENT
63 year old female with uncontrolled T2DM (A1c 11.3%) here with hyperglycemia and lightheadedness. She also has HTN and HLD (LDL 83).

## 2019-06-24 NOTE — ED CDU PROVIDER SUBSEQUENT DAY NOTE - ATTENDING CONTRIBUTION TO CARE
Dr. White:  I performed a face to face bedside interview with patient regarding history of present illness, review of symptoms and past medical history. I completed an independent physical exam.  I have discussed patient's plan of care with PA.   I agree with note as stated above, having amended the EMR as needed to reflect my findings.   This includes HISTORY OF PRESENT ILLNESS, HIV, PAST MEDICAL/SURGICAL/FAMILY/SOCIAL HISTORY, ALLERGIES AND HOME MEDICATIONS, REVIEW OF SYSTEMS, PHYSICAL EXAM, and any PROGRESS NOTES during the time I functioned as the attending physician for this patient.    63F h/o HTN, DM (not compliant with meds) found in the ED to be hyperglycemic, sent to CDU for DM control.  Denies any acute complaints this morning.    Exam:  - nad  - rrr   -ctab  - abd soft ntnd    A/P  - DM  - pending endocrine recs

## 2019-06-24 NOTE — ED CDU PROVIDER DISPOSITION NOTE - CLINICAL COURSE
Christopher:  63F h/o HTN, DM (not compliant with meds) found in the ED to be hyperglycemic, sent to CDU for DM control.  Seen by endocrine, diabetic regimen updated and pt received diabetic teaching.

## 2019-06-24 NOTE — ED CDU PROVIDER DISPOSITION NOTE - ATTENDING CONTRIBUTION TO CARE
Dr. White:  I performed a face to face bedside interview with patient regarding history of present illness, review of symptoms and past medical history. I completed an independent physical exam.  I have discussed patient's plan of care with PA.   I agree with note as stated above, having amended the EMR as needed to reflect my findings.   This includes HISTORY OF PRESENT ILLNESS, HIV, PAST MEDICAL/SURGICAL/FAMILY/SOCIAL HISTORY, ALLERGIES AND HOME MEDICATIONS, REVIEW OF SYSTEMS, PHYSICAL EXAM, and any PROGRESS NOTES during the time I functioned as the attending physician for this patient.

## 2019-06-24 NOTE — CONSULT NOTE ADULT - PROBLEM SELECTOR RECOMMENDATION 3
- LDL goal is <70 but recheck levels outpatient  - For now, continue Simvastatin 20 mg QHS    Stu Renee DO  Pager: [phone]

## 2019-06-24 NOTE — CONSULT NOTE ADULT - SUBJECTIVE AND OBJECTIVE BOX
Reason For Consult: T2DM Management    HPI: Patient is a 63 y.o female with PMHx of HTN, HLD, DM (on metformin, glimeperide, Basaglar) who presents to ED c/o dizziness/feeling lightheaded over past week, noted elevated BG at home in 400's. As per patient states that over the past week she has intermittent dizziness described as room spinning, admits today she was with her son and checked FS at home and noted BG to being in 400's. Pt admits she was started on Basaglar 1-2 years ago, suppose to give 30 units at bedtime. Pt also on PO metformin and amaryl. Pt admits that her current insulin is  and that she sometimes misses her dose of medications. Pt recently seen at [facility] and was set up with Endocrinology outpatient appointment for  with Dr. Worrell. Pt states currently BG being monitored by PCP Dr. Dennis Mario. Pt denies changes in vision, headaches, chest pain, shortness of breath, numbness or tingling, weakness, neck pain, fever, or any other complaints. Pt seen and worked up in ED,  and FS in 400's. Pt given fluids with BG trending to 300's. HA1C 11.3. Pt Transferred to CDU for; BG monitoring, ISS, fluids, Endo consult, vitals q4 and frequent re-eval.  CDU Subsequent Day Note: JERZY Watts, Agree w/ above, pt resting comfortably in CDU w/ no overnight complaints, pending consult w/ endo.    Endocrine History:        PAST MEDICAL & SURGICAL HISTORY:  HTN (hypertension)  HLD (hyperlipidemia)  DM (diabetes mellitus)  No significant past surgical history      FAMILY HISTORY:  No pertinent family history in first degree relatives    Social History:    Outpatient Medications:    MEDICATIONS  (STANDING):  clotrimazole 1% Cream 1 Application(s) Topical two times a day  dextrose 5%. 1000 milliLiter(s) (50 mL/Hr) IV Continuous <Continuous>  dextrose 50% Injectable 12.5 Gram(s) IV Push once  dextrose 50% Injectable 25 Gram(s) IV Push once  dextrose 50% Injectable 25 Gram(s) IV Push once  insulin glargine Injectable (LANTUS) 18 Unit(s) SubCutaneous at bedtime  insulin lispro (HumaLOG) corrective regimen sliding scale LOW  SubCutaneous three times a day before meals  insulin lispro (HumaLOG) corrective regimen sliding scale LOW  SubCutaneous at bedtime  insulin lispro Injectable (HumaLOG) 6 Unit(s) SubCutaneous three times a day before meals  losartan 50 milliGRAM(s) Oral daily  metFORMIN 1000 milliGRAM(s) Oral two times a day  simvastatin 20 milliGRAM(s) Oral at bedtime  sodium chloride 0.9%. 1000 milliLiter(s) (100 mL/Hr) IV Continuous <Continuous>    MEDICATIONS  (PRN):  dextrose 40% Gel 15 Gram(s) Oral once PRN Blood Glucose LESS THAN 70 milliGRAM(s)/deciliter  glucagon  Injectable 1 milliGRAM(s) IntraMuscular once PRN Glucose LESS THAN 70 milligrams/deciliter      Allergies  No Known Allergies    Review of Systems:  Constitutional: No fever  Eyes: No blurry vision  Neuro: No tremors  HEENT: No pain  Cardiovascular: No chest pain, palpitations  Respiratory: No SOB, no cough  GI: No nausea, vomiting, abdominal pain  : No dysuria  Skin: no rash  Psych: no depression  Endocrine: no polyuria, polydipsia  Hem/lymph: no swelling  Osteoporosis: no fractures    ALL OTHER SYSTEMS REVIEWED AND NEGATIVE    PHYSICAL EXAM:  VITALS: T(C): 36.6 (19 @ 06:02)  T(F): 97.8 (19 @ 06:02), Max: 98.8 (19 @ 14:28)  HR: 73 (19 @ 06:02) (71 - 84)  BP: 153/83 (19 @ 06:02) (147/90 - 183/83)  RR:  (16 - 18)  SpO2:  (99% - 100%)  Wt(kg): 86.4 kg    GENERAL: NAD, well-groomed, well-developed  EYES: No proptosis, no lid lag, anicteric  HEENT:  Atraumatic, Normocephalic, moist mucous membranes  THYROID: Normal size, no palpable nodules  RESPIRATORY: Clear to auscultation bilaterally; No rales, rhonchi, wheezing, or rubs  CARDIOVASCULAR: Regular rate and rhythm; No murmurs; no peripheral edema  GI: Soft, nontender, non distended, normal bowel sounds  SKIN: Dry, intact, No rashes or lesions  MUSCULOSKELETAL: Full range of motion, normal strength  NEURO: sensation intact, extraocular movements intact, no tremor, normal reflexes  PSYCH: Alert and oriented x 3, normal affect, normal mood  CUSHING'S SIGNS: no striae    POCT Blood Glucose.: 142 mg/dL (19 @ 09:15) H6    POCT Blood Glucose.: 117 mg/dL (19 @ 22:03) L18  POCT Blood Glucose.: 289 mg/dL (19 @ 18:26) H6+3  POCT Blood Glucose.: 340 mg/dL (19 @ 16:44)  POCT Blood Glucose.: 475 mg/dL (19 @ 14:33)                            12.9   7.92  )-----------( 292      ( 2019 15:05 )             41.5           144  |  108<H>  |  10  ----------------------------<  149<H>  3.8   |  27  |  0.61    EGFR if : 113  EGFR if non : 98    Ca    8.8          TPro  7.5  /  Alb  4.4  /  TBili  0.4  /  DBili  x   /  AST  10  /  ALT  18  /  AlkPhos  97      Hemoglobin A1C, Whole Blood: 11.3 % <H> [4.0 - 5.6] (19 @ 14:57)     Chol 129 LDL 83 HDL 42<L> Trig 81 Reason For Consult: T2DM Management    HPI: Patient is a 63 y.o female with PMHx of HTN, HLD, DM (on metformin, glimeperide, Basaglar) who presents to ED c/o dizziness/feeling lightheaded over past week, noted elevated BG at home in 400's. As per patient states that over the past week she has intermittent dizziness described as room spinning, admits today she was with her son and checked FS at home and noted BG to being in 400's. Pt admits she was started on Basaglar 1-2 years ago, suppose to give 30 units at bedtime. Pt also on PO metformin and amaryl. Pt admits that her current insulin is  and that she sometimes misses her dose of medications. Pt recently seen at [facility] and was set up with Endocrinology outpatient appointment for  with Dr. Worrell. Pt states currently BG being monitored by PCP Dr. Dennis Mario. Pt denies changes in vision, headaches, chest pain, shortness of breath, numbness or tingling, weakness, neck pain, fever, or any other complaints. Pt seen and worked up in ED,  and FS in 400's. Pt given fluids with BG trending to 300's. HA1C 11.3. Pt Transferred to CDU for; BG monitoring, ISS, fluids, Endo consult, vitals q4 and frequent re-eval.  CDU Subsequent Day Note: JERZY Watts, Agree w/ above, pt resting comfortably in CDU w/ no overnight complaints, pending consult w/ endo.    Endocrine History:  NO endocrinologist yet. T2DM diagnosed 1 year ago, no neuropathy, no retinopathy (last Ophtho was 2019 with dilated eye exam), no known nephropathy. No h/o MI or CVA. She admits to going to [facility] on May 17 x 1 day and was discharged on Basaglar 30 units QHS, Glimepiride 1 mg QD, and Metformin 1000 mg BID. She admits to forgetting her meds/insulin so has not been taking it. Also admits that she has not been checking her FS and only checks 3x/week. Noticed -400s at home and FS was 475 at home prior to coming to the hospital. When injecting insulin was doing it correctly in the abdomen. She admits to eating 3 meals, B- sandwich, eggs, cereal with milk, coffee with sugar and milk. Lunch can be chicken, salad, vegetable, rice, corn, yams, plantains. Dinner can be cereal, soups. Eats at 6pm. If she skips dinner then will eat HS snack which is PB&J sandwich. She admits to reducing soda and juice intake but continues to indulge from time to time. She admits to occ eating cookies few times per week.    She admits to taking Losartan and Simvastatin QD for HTN and HLD.      PAST MEDICAL & SURGICAL HISTORY:  HTN (hypertension)  HLD (hyperlipidemia)  DM (diabetes mellitus)  No significant past surgical history      FAMILY HISTORY:  T2DM in mother    Social History: No tobacco, alcohol, illicit drug use.    Outpatient Medications: Losartan 50 mg QD, Metformin 1000 mg BID, Basaglar 30 units QHS, Glimepiride 1 mg QD, Simvastatin 20 mg QHS    MEDICATIONS  (STANDING):  clotrimazole 1% Cream 1 Application(s) Topical two times a day  dextrose 5%. 1000 milliLiter(s) (50 mL/Hr) IV Continuous <Continuous>  dextrose 50% Injectable 12.5 Gram(s) IV Push once  dextrose 50% Injectable 25 Gram(s) IV Push once  dextrose 50% Injectable 25 Gram(s) IV Push once  insulin glargine Injectable (LANTUS) 18 Unit(s) SubCutaneous at bedtime  insulin lispro (HumaLOG) corrective regimen sliding scale LOW  SubCutaneous three times a day before meals  insulin lispro (HumaLOG) corrective regimen sliding scale LOW  SubCutaneous at bedtime  insulin lispro Injectable (HumaLOG) 6 Unit(s) SubCutaneous three times a day before meals  losartan 50 milliGRAM(s) Oral daily  metFORMIN 1000 milliGRAM(s) Oral two times a day  simvastatin 20 milliGRAM(s) Oral at bedtime  sodium chloride 0.9%. 1000 milliLiter(s) (100 mL/Hr) IV Continuous <Continuous>    MEDICATIONS  (PRN):  dextrose 40% Gel 15 Gram(s) Oral once PRN Blood Glucose LESS THAN 70 milliGRAM(s)/deciliter  glucagon  Injectable 1 milliGRAM(s) IntraMuscular once PRN Glucose LESS THAN 70 milligrams/deciliter      Allergies  No Known Allergies    Review of Systems:  Constitutional: No fever  Eyes: No blurry vision  Neuro: No tremors  HEENT: No pain  Cardiovascular: No chest pain, palpitations  Respiratory: No SOB, no cough  GI: No nausea, vomiting, abdominal pain  : + dysuria  Skin: no rash  Psych: no depression  Endocrine: + polyuria, + polydipsia, + dysuria  Hem/lymph: no swelling  Osteoporosis: no fractures    ALL OTHER SYSTEMS REVIEWED AND NEGATIVE    PHYSICAL EXAM:  VITALS: T(C): 36.6 (19 @ 06:02)  T(F): 97.8 (19 @ 06:02), Max: 98.8 (19 @ 14:28)  HR: 73 (19 @ 06:02) (71 - 84)  BP: 153/83 (19 @ 06:02) (147/90 - 183/83)  RR:  (16 - 18)  SpO2:  (99% - 100%)  Wt(kg): 86.4 kg    GENERAL: NAD, well-groomed, well-developed, obese female  EYES: No proptosis, no lid lag, anicteric  HEENT:  Atraumatic, Normocephalic, moist mucous membranes  THYROID: Normal size, no palpable nodules  RESPIRATORY: Clear to auscultation bilaterally; No rales, rhonchi, wheezing, or rubs  CARDIOVASCULAR: Regular rate and rhythm; No murmurs; no peripheral edema  GI: Soft, nontender, non distended, normal bowel sounds  SKIN: Dry, intact, No rashes or lesions  MUSCULOSKELETAL: Full range of motion, normal strength  NEURO: sensation intact, extraocular movements intact, no tremor, normal reflexes  PSYCH: Alert and oriented x 3, normal affect, normal mood  CUSHING'S SIGNS: no striae    POCT Blood Glucose.: 142 mg/dL (19 @ 09:15) H6    POCT Blood Glucose.: 117 mg/dL (19 @ 22:03) L18  POCT Blood Glucose.: 289 mg/dL (19 @ 18:26) H6+3  POCT Blood Glucose.: 340 mg/dL (19 @ 16:44)  POCT Blood Glucose.: 475 mg/dL (19 @ 14:33)                            12.9   7.92  )-----------( 292      ( 2019 15:05 )             41.5           144  |  108<H>  |  10  ----------------------------<  149<H>  3.8   |  27  |  0.61    EGFR if : 113  EGFR if non : 98    Ca    8.8          TPro  7.5  /  Alb  4.4  /  TBili  0.4  /  DBili  x   /  AST  10  /  ALT  18  /  AlkPhos  97      Hemoglobin A1C, Whole Blood: 11.3 % <H> [4.0 - 5.6] (19 @ 14:57)     Chol 129 LDL 83 HDL 42<L> Trig 81

## 2019-06-24 NOTE — ED CDU PROVIDER SUBSEQUENT DAY NOTE - PROGRESS NOTE DETAILS
CDU JERZY Ravi: Received signout on patient- seen and examined this morning with attending. Patient rested comfortably overnight. Pt with no acute complaints.  Pt feels improved. Awaiting endo final recs.

## 2019-06-24 NOTE — ED CDU PROVIDER SUBSEQUENT DAY NOTE - HISTORY
CDU Initial Day Note:· Patient is a 63 y.o female with PMHx of HTN, HLD, DM (on metformin, glimeperide, Basaglar) who presents to ED c/o dizziness/feeling lightheaded over past week, noted elevated BG at home in 400's. As per patient states that over the past week she has intermittent dizziness described as room spinning, admits today she was with her son and checked FS at home and noted BG to being in 400's. Pt admits she was started on Basaglar 1-2 years ago, suppose to give 30 units at bedtime. Pt also on PO metformin and amaryl. Pt admits that her current insulin is  and that she sometimes misses her dose of medications. Pt recently seen at [facility] and was set up with Endocrinology outpatient appointment for  with Dr. Worrell. Pt states currently BG being monitored by PCP Dr. Dennis Mario. Pt denies changes in vision, headaches, chest pain, shortness of breath, numbness or tingling, weakness, neck pain, fever, or any other complaints. Pt seen and worked up in ED,  and FS in 400's. Pt given fluids with BG trending to 300's. HA1C 11.3. Pt Transferred to CDU for; BG monitoring, ISS, fluids, Endo consult, vitals q4 and frequent re-eval.  CDU Subsequent Day Note: JERZY Watts, Agree w/ above, pt resting comfortably in CDU w/ no overnight complaints, pending consult w/ endo.

## 2021-03-10 ENCOUNTER — NON-APPOINTMENT (OUTPATIENT)
Age: 66
End: 2021-03-10

## 2021-03-10 ENCOUNTER — TRANSCRIPTION ENCOUNTER (OUTPATIENT)
Age: 66
End: 2021-03-10

## 2021-03-10 DIAGNOSIS — Z78.9 OTHER SPECIFIED HEALTH STATUS: ICD-10-CM

## 2021-03-10 DIAGNOSIS — Z01.812 ENCOUNTER FOR PREPROCEDURAL LABORATORY EXAMINATION: ICD-10-CM

## 2021-03-10 PROBLEM — I10 ESSENTIAL (PRIMARY) HYPERTENSION: Chronic | Status: ACTIVE | Noted: 2019-06-23

## 2021-03-10 PROBLEM — Z00.00 ENCOUNTER FOR PREVENTIVE HEALTH EXAMINATION: Status: ACTIVE | Noted: 2021-03-10

## 2021-03-10 PROBLEM — E11.9 TYPE 2 DIABETES MELLITUS WITHOUT COMPLICATIONS: Chronic | Status: ACTIVE | Noted: 2019-06-23

## 2021-03-10 PROBLEM — E78.5 HYPERLIPIDEMIA, UNSPECIFIED: Chronic | Status: ACTIVE | Noted: 2019-06-23

## 2021-03-12 ENCOUNTER — OUTPATIENT (OUTPATIENT)
Dept: OUTPATIENT SERVICES | Facility: HOSPITAL | Age: 66
LOS: 1 days | Discharge: HOME | End: 2021-03-12
Payer: COMMERCIAL

## 2021-03-12 ENCOUNTER — APPOINTMENT (OUTPATIENT)
Dept: GASTROENTEROLOGY | Facility: HOSPITAL | Age: 66
End: 2021-03-12

## 2021-03-12 ENCOUNTER — RESULT REVIEW (OUTPATIENT)
Age: 66
End: 2021-03-12

## 2021-03-12 ENCOUNTER — TRANSCRIPTION ENCOUNTER (OUTPATIENT)
Age: 66
End: 2021-03-12

## 2021-03-12 LAB
GLUCOSE BLDC GLUCOMTR-MCNC: 229 MG/DL — HIGH (ref 70–99)
SARS-COV-2 N GENE NPH QL NAA+PROBE: NOT DETECTED

## 2021-03-12 PROCEDURE — 88305 TISSUE EXAM BY PATHOLOGIST: CPT

## 2021-03-12 PROCEDURE — 43239 EGD BIOPSY SINGLE/MULTIPLE: CPT | Mod: XU

## 2021-03-12 PROCEDURE — 82962 GLUCOSE BLOOD TEST: CPT

## 2021-03-12 PROCEDURE — 88313 SPECIAL STAINS GROUP 2: CPT

## 2021-03-12 PROCEDURE — 43242 EGD US FINE NEEDLE BX/ASPIR: CPT

## 2021-03-12 PROCEDURE — 88313 SPECIAL STAINS GROUP 2: CPT | Mod: 26

## 2021-03-12 PROCEDURE — 88307 TISSUE EXAM BY PATHOLOGIST: CPT

## 2021-03-12 PROCEDURE — 88307 TISSUE EXAM BY PATHOLOGIST: CPT | Mod: 26

## 2021-03-12 PROCEDURE — 43239 EGD BIOPSY SINGLE/MULTIPLE: CPT | Mod: XS

## 2021-03-12 PROCEDURE — 88305 TISSUE EXAM BY PATHOLOGIST: CPT | Mod: 26

## 2021-03-12 RX ORDER — ACETAMINOPHEN 500 MG
1000 TABLET ORAL ONCE
Refills: 0 | Status: DISCONTINUED | OUTPATIENT
Start: 2021-03-12 | End: 2021-03-12

## 2021-03-15 LAB — SURGICAL PATHOLOGY STUDY: SIGNIFICANT CHANGE UP

## 2021-03-16 ENCOUNTER — RESULT REVIEW (OUTPATIENT)
Age: 66
End: 2021-03-16

## 2021-03-16 ENCOUNTER — OUTPATIENT (OUTPATIENT)
Dept: OUTPATIENT SERVICES | Facility: HOSPITAL | Age: 66
LOS: 1 days | End: 2021-03-16
Payer: COMMERCIAL

## 2021-03-16 ENCOUNTER — APPOINTMENT (OUTPATIENT)
Dept: MULTI SPECIALTY CLINIC | Facility: CLINIC | Age: 66
End: 2021-03-16
Payer: MEDICAID

## 2021-03-16 ENCOUNTER — OUTPATIENT (OUTPATIENT)
Dept: OUTPATIENT SERVICES | Facility: HOSPITAL | Age: 66
LOS: 1 days | Discharge: HOME | End: 2021-03-16

## 2021-03-16 ENCOUNTER — NON-APPOINTMENT (OUTPATIENT)
Age: 66
End: 2021-03-16

## 2021-03-16 ENCOUNTER — APPOINTMENT (OUTPATIENT)
Dept: MULTI SPECIALTY CLINIC | Facility: CLINIC | Age: 66
End: 2021-03-16

## 2021-03-16 ENCOUNTER — APPOINTMENT (OUTPATIENT)
Dept: CT IMAGING | Facility: IMAGING CENTER | Age: 66
End: 2021-03-16

## 2021-03-16 VITALS
RESPIRATION RATE: 17 BRPM | HEIGHT: 62.44 IN | OXYGEN SATURATION: 96 % | BODY MASS INDEX: 30.61 KG/M2 | TEMPERATURE: 97.2 F | HEART RATE: 99 BPM | DIASTOLIC BLOOD PRESSURE: 84 MMHG | SYSTOLIC BLOOD PRESSURE: 175 MMHG | WEIGHT: 170.62 LBS

## 2021-03-16 DIAGNOSIS — I10 ESSENTIAL (PRIMARY) HYPERTENSION: ICD-10-CM

## 2021-03-16 DIAGNOSIS — C25.9 MALIGNANT NEOPLASM OF PANCREAS, UNSPECIFIED: ICD-10-CM

## 2021-03-16 DIAGNOSIS — K86.9 DISEASE OF PANCREAS, UNSPECIFIED: ICD-10-CM

## 2021-03-16 DIAGNOSIS — C78.7 MALIGNANT NEOPLASM OF PANCREAS, UNSPECIFIED: ICD-10-CM

## 2021-03-16 DIAGNOSIS — E11.9 TYPE 2 DIABETES MELLITUS W/OUT COMPLICATIONS: ICD-10-CM

## 2021-03-16 DIAGNOSIS — Z86.39 PERSONAL HISTORY OF OTHER ENDOCRINE, NUTRITIONAL AND METABOLIC DISEASE: ICD-10-CM

## 2021-03-16 DIAGNOSIS — E78.5 HYPERLIPIDEMIA, UNSPECIFIED: ICD-10-CM

## 2021-03-16 DIAGNOSIS — K86.89 OTHER SPECIFIED DISEASES OF PANCREAS: ICD-10-CM

## 2021-03-16 LAB
BASOPHILS # BLD AUTO: 0 K/UL — SIGNIFICANT CHANGE UP (ref 0–0.2)
BASOPHILS NFR BLD AUTO: 0 % — SIGNIFICANT CHANGE UP (ref 0–2)
EOSINOPHIL # BLD AUTO: 0.06 K/UL — SIGNIFICANT CHANGE UP (ref 0–0.5)
EOSINOPHIL NFR BLD AUTO: 1 % — SIGNIFICANT CHANGE UP (ref 0–6)
HCT VFR BLD CALC: 41.8 % — SIGNIFICANT CHANGE UP (ref 34.5–45)
HGB BLD-MCNC: 13 G/DL — SIGNIFICANT CHANGE UP (ref 11.5–15.5)
LYMPHOCYTES # BLD AUTO: 1.64 K/UL — SIGNIFICANT CHANGE UP (ref 1–3.3)
LYMPHOCYTES # BLD AUTO: 26 % — SIGNIFICANT CHANGE UP (ref 13–44)
MCHC RBC-ENTMCNC: 25.2 PG — LOW (ref 27–34)
MCHC RBC-ENTMCNC: 31.1 G/DL — LOW (ref 32–36)
MCV RBC AUTO: 81.2 FL — SIGNIFICANT CHANGE UP (ref 80–100)
MONOCYTES # BLD AUTO: 0.57 K/UL — SIGNIFICANT CHANGE UP (ref 0–0.9)
MONOCYTES NFR BLD AUTO: 9 % — SIGNIFICANT CHANGE UP (ref 2–14)
NEUTROPHILS # BLD AUTO: 4.04 K/UL — SIGNIFICANT CHANGE UP (ref 1.8–7.4)
NEUTROPHILS NFR BLD AUTO: 64 % — SIGNIFICANT CHANGE UP (ref 43–77)
NRBC # BLD: 0 /100 — SIGNIFICANT CHANGE UP (ref 0–0)
NRBC # BLD: SIGNIFICANT CHANGE UP /100 WBCS (ref 0–0)
PLAT MORPH BLD: NORMAL — SIGNIFICANT CHANGE UP
PLATELET # BLD AUTO: 247 K/UL — SIGNIFICANT CHANGE UP (ref 150–400)
RBC # BLD: 5.15 M/UL — SIGNIFICANT CHANGE UP (ref 3.8–5.2)
RBC # FLD: 14.6 % — HIGH (ref 10.3–14.5)
RBC BLD AUTO: SIGNIFICANT CHANGE UP
WBC # BLD: 6.32 K/UL — SIGNIFICANT CHANGE UP (ref 3.8–10.5)
WBC # FLD AUTO: 6.32 K/UL — SIGNIFICANT CHANGE UP (ref 3.8–10.5)

## 2021-03-16 PROCEDURE — 71250 CT THORAX DX C-: CPT

## 2021-03-16 PROCEDURE — 71250 CT THORAX DX C-: CPT | Mod: 26

## 2021-03-16 PROCEDURE — 99205 OFFICE O/P NEW HI 60 MIN: CPT

## 2021-03-16 PROCEDURE — 99072 ADDL SUPL MATRL&STAF TM PHE: CPT

## 2021-03-16 RX ORDER — MORPHINE SULFATE 15 MG/1
15 TABLET, FILM COATED, EXTENDED RELEASE ORAL
Qty: 30 | Refills: 0 | Status: ACTIVE | COMMUNITY
Start: 2021-03-16 | End: 1900-01-01

## 2021-03-17 ENCOUNTER — NON-APPOINTMENT (OUTPATIENT)
Age: 66
End: 2021-03-17

## 2021-03-17 LAB
ALBUMIN SERPL ELPH-MCNC: 4.8 G/DL
ALP BLD-CCNC: 174 U/L
ALT SERPL-CCNC: 32 U/L
AMYLASE/CREAT SERPL: 16 U/L
ANION GAP SERPL CALC-SCNC: 20 MMOL/L
AST SERPL-CCNC: 20 U/L
BILIRUB SERPL-MCNC: 0.5 MG/DL
BUN SERPL-MCNC: 10 MG/DL
CALCIUM SERPL-MCNC: 10.3 MG/DL
CANCER AG125 SERPL-ACNC: 34 U/ML
CANCER AG19-9 SERPL-ACNC: 4737 U/ML
CHLORIDE SERPL-SCNC: 103 MMOL/L
CO2 SERPL-SCNC: 22 MMOL/L
CREAT SERPL-MCNC: 0.57 MG/DL
GLUCOSE SERPL-MCNC: 262 MG/DL
HAV IGM SER QL: NONREACTIVE
HBV CORE IGM SER QL: NONREACTIVE
HBV SURFACE AG SER QL: NONREACTIVE
HCV AB SER QL: NONREACTIVE
HCV S/CO RATIO: 0.07 S/CO
INR PPP: 1.13 RATIO
POTASSIUM SERPL-SCNC: 4.6 MMOL/L
PROT SERPL-MCNC: 7.3 G/DL
PT BLD: 13.3 SEC
SODIUM SERPL-SCNC: 146 MMOL/L

## 2021-03-17 RX ORDER — LOSARTAN POTASSIUM 50 MG/1
50 TABLET, FILM COATED ORAL DAILY
Qty: 30 | Refills: 0 | Status: ACTIVE | COMMUNITY
Start: 2021-03-17

## 2021-03-17 RX ORDER — ROSUVASTATIN CALCIUM 20 MG/1
20 TABLET, FILM COATED ORAL DAILY
Refills: 0 | Status: ACTIVE | COMMUNITY
Start: 2021-03-17

## 2021-03-17 RX ORDER — METFORMIN HYDROCHLORIDE 1000 MG/1
1000 TABLET, COATED ORAL
Refills: 0 | Status: ACTIVE | COMMUNITY
Start: 2021-03-17

## 2021-03-17 RX ORDER — SENNOSIDES 8.6 MG TABLETS 8.6 MG/1
8.6 TABLET ORAL
Qty: 60 | Refills: 0 | Status: ACTIVE | COMMUNITY
Start: 2021-03-17

## 2021-03-17 RX ORDER — OXYCODONE HYDROCHLORIDE 5 MG/1
5 CAPSULE ORAL
Qty: 40 | Refills: 0 | Status: ACTIVE | COMMUNITY
Start: 2021-03-17

## 2021-03-17 RX ORDER — INSULIN LISPRO 200 [IU]/ML
200 INJECTION, SOLUTION SUBCUTANEOUS
Refills: 0 | Status: ACTIVE | COMMUNITY
Start: 2021-03-17

## 2021-03-17 RX ORDER — AMLODIPINE BESYLATE 5 MG/1
5 TABLET ORAL DAILY
Refills: 0 | Status: ACTIVE | COMMUNITY
Start: 2021-03-17

## 2021-03-17 RX ORDER — LORATADINE 10 MG
17 TABLET,DISINTEGRATING ORAL DAILY
Qty: 30 | Refills: 2 | Status: ACTIVE | COMMUNITY
Start: 2021-03-17

## 2021-03-17 NOTE — REASON FOR VISIT
[Initial Consultation] : an initial consultation [FreeTextEntry2] : Metastatic Pancreatic adenocarcinoma

## 2021-03-17 NOTE — HISTORY OF PRESENT ILLNESS
[de-identified] : Diagnosis: Metastatic Pancreatic Adenocarcinoma\par \par Genetic Testing: Will send Foundation test- CDX\par \par Other Current Medical Problems: HTN, HLD, Type 2 DM,\par \par Oncological History :\par \par 65 yr old F began to experience intermittent abdominal pain in Dec 2020  that became more persistent over time and exacerbate by eating & relieved with Tylenol.Her PMD refered her for a CT Abd/ pelvis on 3/8/21 showed ill defined somewhat infiltrating mass in the mid pancreatic body measuring 4.6 x 3.1 cms. The mass encases the SMA & at least a portion f the celiac axis. Mild dilatation of duct in the pancreatic tail. There is a 2.4 metastatic lymph node or nodule along the posterior distal stomach which could be invading serosal surface of the stomach. Atleast 2 metastatic lesions superior right hepatic lobe measuring 1.6  & 0.8 cm and a metasttaic lesion in the anterior left hepatic lobe measuring 1.1 cms. She also reports 11 lbs weight loss over past 6 weeks. She denies N/V/ change in bowel habit / alcoholic stools/ fatty stools/ dark urine/ jaundice/ dyspepsia/ bloating. \par \par Pt saw Dr. Clarita Leary from [facility]\par \par PMD: Dr. Mario, Serge ([phone])\par  Oncology - Dr Clarita Leary - [phone] \par \par FHX: No family hx of cancer \par \par Social HX: non smoker, no alcohol intake,\par \par PSX: Tubal ligation \par \par Last Colonoscopy - 3 years ago \par Pap smear - 2 years ago \par \par \par \par Disease:\par Pathology: pancreatic adenocarcinoma\par \par AJCC Stage : 4\par \par Tumor Markers: >4000\par  [de-identified] : 3/17/21- Pt was accompanied with her sister and she had her niece/ son/ daughter in law on the phone. Te family is very anxious about the diagnosis and plan of care. Had multiple questions- Pt was seen by Dr Clarita Rome from [facility]- Pt has another appointment with her tommorow- \par Family and patient are indecisive as to where to start the chemo care -either with Dr Rome vs at [facility].\par We discussed about getting a PET CT and CT chest- \par Will plan with the next step for Port placement after the family decides where they woul dlike to establish care .

## 2021-03-17 NOTE — END OF VISIT
[] : Fellow [FreeTextEntry3] : Treatment options discussed included:\par Modified FOLFIRINOX (Folinic acid/Leucovorin, 5-Fluouracil, Irinotecan, Oxaliplatin) administered intravenously.Given that both regimens are standard of care, study treatment will be administered as per standard of care at each institution including a maintenance therapy approach which is encouraged in both arms. Dose modifications, anti-emetics, supportive medications, and use of growth factors should follow institutional guidelines.\par \par Gemcitabine/nab-Paclitaxel\par Gemcitabine/nab-Paclitaxel administered intravenously. Given that both regimens are standard of care, study treatment will be administered as per standard of care at each institution including a maintenance therapy approach which is encouraged in both arms. Dose modifications, anti-emetics, supportive medications, and use of growth factors should follow institutional guidelines.\par \par PASS-01 study: Pancreatic Adenocarcinoma Signature Stratification for Treatment was also mentioned though we are waiting on ite opening, expecting soon\par \par In addition, supportive care for pain, constipation, PI and antiemetics were also discussed\par Moral support was offered\par Also talked to mx family members and all questions were responded appropriately\par \par

## 2021-03-17 NOTE — PHYSICAL EXAM
[Restricted in physically strenuous activity but ambulatory and able to carry out work of a light or sedentary nature] : Status 1- Restricted in physically strenuous activity but ambulatory and able to carry out work of a light or sedentary nature, e.g., light house work, office work [Normal] : full range of motion and no deformities appreciated [de-identified] : tender to palpation at the epigastric area

## 2021-03-17 NOTE — ASSESSMENT
[FreeTextEntry1] : 1-Therapeutic: \par -----------------\par Pancreatic cancer with mets as shown in CT from outside facility\par Needs both somatic and germ line mutations tested\par Discussed treatment option FOLFIRINOX vs [place]- Abraxane. As patient has good PS, recommended starting FOLFIRINOX. \par Pt will have to decide whether to receive treatment with Dr. Leary at [facility] or with us. Pt has appointment with Dr. Leary on 3/17. Family will let us know about where to receive treatment tomorrow (3/17).\par \par Also discuss PASS-01 trial that will become available later this week. Pancreatic Adenocarcinoma Signature Stratification for Treatment (PASS-01) Study is a potential option pending its opening hopefully this week\par \par This is a randomized multicentre phase II trial with a large translational component. The trial will evaluate the two standard chemotherapy regimens: modified FOLFIRINOX (mFFX) and gemcitabine/nab-paclitaxel (GA), in patients with untreated metastatic pancreatic ductal adenocarcinoma. Integrated into this phase II trial are a number of laboratory components including molecular profiling, patient derived organoid establishment, and drug testing sensitivity and other biomarkers.\par \par \par Detailed Description:\par The two chemotherapy regimens GA and mFFX remain standard treatment options without biomarkers to predict response. PASS-01 will for the first time explore progression free survival differences in the two standard backbone regimens used in the advanced setting. Biomarker driven strategies in pancreatic ductal adenocarcinoma (PDAC) are lacking, perhaps accounting for a large number of failed phase II studies. This study will evaluate two standard of care chemotherapy regimens, but will also explore high content molecular profiling, chemotherapy sensitivity signatures, GATA6 and other putative biomarkers as predictors of response to chemotherapy. In addition, the use of patient derived organoid models for personalized medicine in PDAC will continue to develop within this study.\par \par Approximately 150 patients diagnosed with untreated metastatic pancreatic cancer will be randomized to either arm.\par \par Outcome Measures\par ------------------------\par Primary Outcome Measures  :\par Progression free survival(PFS) in mFFX and GA arms pancreatic ductal adenocarcinoma (PDAC) in a randomized phase II trial. [ Time Frame: 2-4 years ]\par Time from the date of randomization to progression based on the radiology assessment of response using RECIST v1.1, or death, whichever is earlier\par \par \par Secondary Outcome Measures  :\par ALATORRE by RECIST 1.1 and duration of response in patients receiving mFFX or GA [ Time Frame: 2-4 years ]\par percentage of patient's measurable disease who have achieved either complete response (CR) or partial response (NE)\par \par Overall survival (OS) associated with mFFX or GA profiles, signatures and pharmacotyping [ Time Frame: 2-4 years ]\par GATA6 as a biomarker of response to mFFX or GA [ Time Frame: 2-4 years ]\par • Concordance between organoid transcriptomic profiles (RNAseq) and patient transcriptomic profiles (descriptive statistics) [ Time Frame: 2-4 years ]\par • Concordance between chemotherapy sensitivity signature predictions and response to first line treatment (descriptive statistics). [ Time Frame: 2-4 years ]\par • Correlation of individual tumour cytokeratins (eg. CK5 and CK17 expression) with chemotherapy response and resistance [ Time Frame: 2-4 years ]\par Cell free circulating tumor (ct) DNA analysis (including KRAS mutational status) [ Time Frame: 2-4 years ]\par Cluster Tendency analysis using artificial neural networks and radiomic methods combined [ Time Frame: 2-4 years ]\par \par \par 2-Diagnostic: \par ---------------\par Labs: Baseline Labs including coags, CEA, , lipase, amylase\par Scans: CT scans  chest, \par Will get baseline PET scan\par \par 3- Pharmacogenetics testing:\par ----------------------------------\par - Plan to check for  UGT1A1, DPYD and pharmacogenetics in anticipation for treatment with 5-FU and \par   Onivyde \par - Plan to order MSI for future PDL-1 therapy \par - Somatic and germline mutations per NCCN guidelines\par \par 4- Procedures:\par -----------------\par - Plan for Port-A-Cath placement if the patient decides to receive tx at senior living\par - Hugh Cath: Will need hugh cath double lumen power port for chemo; Make sure that pt. is not on ASA but on lovenox BID as anticoagulation \par \par 5-Supportive: \par -----------------\par Pain: \par Pancreatic insufficiency: Will start CREON - [address] units (2 pills with each snack and 4 pills with each meal) for pancreatic replacement\par diabetes: pt already on insulin\par \par \par 6- RTC: \par --------- -After above tests performed \par \par

## 2021-03-18 DIAGNOSIS — Z01.818 ENCOUNTER FOR OTHER PREPROCEDURAL EXAMINATION: ICD-10-CM

## 2021-03-19 ENCOUNTER — APPOINTMENT (OUTPATIENT)
Dept: DISASTER EMERGENCY | Facility: CLINIC | Age: 66
End: 2021-03-19

## 2021-03-20 LAB — SARS-COV-2 N GENE NPH QL NAA+PROBE: NOT DETECTED

## 2021-03-21 ENCOUNTER — TRANSCRIPTION ENCOUNTER (OUTPATIENT)
Age: 66
End: 2021-03-21

## 2021-03-22 ENCOUNTER — NON-APPOINTMENT (OUTPATIENT)
Age: 66
End: 2021-03-22

## 2021-03-22 ENCOUNTER — OUTPATIENT (OUTPATIENT)
Dept: OUTPATIENT SERVICES | Facility: HOSPITAL | Age: 66
LOS: 1 days | Discharge: HOME | End: 2021-03-22
Payer: MEDICAID

## 2021-03-22 LAB — GLUCOSE BLDC GLUCOMTR-MCNC: 208 MG/DL — HIGH (ref 70–99)

## 2021-03-22 PROCEDURE — 71045 X-RAY EXAM CHEST 1 VIEW: CPT | Mod: 26

## 2021-03-23 ENCOUNTER — APPOINTMENT (OUTPATIENT)
Dept: HEMATOLOGY ONCOLOGY | Facility: CLINIC | Age: 66
End: 2021-03-23

## 2021-03-30 NOTE — HISTORY OF PRESENT ILLNESS
[Abdominal Pain] : abdominal pain [Weight loss] : weight loss [Celiac axis] : Celiac axis [SMA] : SMA [body] : body [EUS] : EUS [Biopsy] : biopsy performed [Was the patient reviewed at [facility]?] : The patient reviewed at [facility] [1] : 1 [Pancreatic ductal adenocarcinoma] : Pancreatic ductal adenocarcinoma [Metastatic] : metastatic [More than 180 (encasement)] : more than 180 (encasement) [Chemotherapy] : chemotherapy [de-identified] : ***this is a non billable note\par \par Hayley is a 65 year-old female presenting for an initial PMDC evaluation for treatment recommendations.  Her past medical history includes hypertension, hyperlipidemia and type 2 diabetes (on metformin and insulin, A1C 10.6 on 1/20/21, was 9.4 one year ago). Past surgical history includes a tubal ligation.  She is not aware of any family history of cancer.  She denies any past or current alcohol or tobacco use.\par \par In December 2020 she began to experience intermittent abdominal discomfort that has become more persistent over time and exacerbated by eating and somewhat relieved with Tylenol.  Her PMD referred her for a CT of the abdomen and pelvis with and without contrast on 3/8/21.  There is an ill-defined somewhat infiltrating mass in the mid pancreatic body measuring at least 4.6 x 3.1 cm.  The mass encases the SMA and at least a portion of the celiac axis.  There is mild dilation of the duct in the pancreatic tail.  There is a 2.4 metastatic lymph node or nodule along the posterior distal stomach which could be invading the serosal surface of the stomach.  There are at least 2 metastatic lesions in the superior right hepatic lobe measuring 1.6 and 0.8 cm and a metastatic lesion in the anterior left hepatic lobe measuring 1.1 cm.  Several other ill-defined metastasis are suspected inferiorly in the right hepatic lobe.   There is at least one subcentimeter left adrenal nodule of uncertain significance, fat containing umbilical hernia, multi fibroid uterus and a 2.1 cm right ovarian cyst which should be monitored sonographically.\par \par She saw a medical oncologist at [facility] on 3/10/21 who recommended tissue bx to confirm disease and initiate chemotherapy in the setting of likely metastatic disease. \par \par On 3/12/21 she underwent EGD/EUS and bx of the pancreatic body mass that confirmed ductal adenocarcinoma. Liver, left lobe core bx confirmed metastatic pancreatic adenocarcinoma.\par \par \par In addition to her abdominal pain, she reports 11 lb weight loss over the last 6 weeks.  She denies any nausea/vomiting, bowel habit changes, acholic stools, fatty stools, dark urine, jaundice, dyspepsia or bloating.  She notes an umbilical hernia which is asymptomatic.\par \par PCP: Dr. Dennis Mario\maninder Carlton: (C) [phone] (H) [phone] [TextBox_4] : 3/8/21 [TextBox_6] : Pancreatic mass on CT with probable bilobar liver metastasis [TextBox_21] : 2586 [TextBox_23] : 2946 [TextBox_39] : [id number] [TextBox_41] : panc body- ductal adenocarcinoma; Liver, left lobe- metastatic pancreatic adenocarcinoma [TextBox_43] : 3/12/21 [TextBox_5] : 3/16/21 [TextBox_38] : 1860 [TextBox_40] : 3/8/21 [TextBox_56] : 46mm [History of Neoadjuvant Therapy] : Patient has no history of neoadjuvant therapy

## 2021-03-30 NOTE — PHYSICAL EXAM
[Restricted in physically strenuous activity but ambulatory and able to carry out work of a light or sedentary nature] : Status 1- Restricted in physically strenuous activity but ambulatory and able to carry out work of a light or sedentary nature, e.g., light house work, office work [Normal] : full range of motion and no deformities appreciated [de-identified] : tender to palpation at the epigastric area

## 2021-03-30 NOTE — ASSESSMENT
[FreeTextEntry1] : Med Onc\par Freddy Saif\par \par 1-Therapeutic: \par -----------------\par Pancreatic cancer with mets as shown in CT from outside facility\par Needs both somatic and germ line mutations tested\par Discussed treatment option FOLFIRINOX vs Arnold- Abraxane. As patient has good PS, recommended starting FOLFIRINOX. \par Pt will have to decide whether to receive treatment with Dr. Leary at [facility] or with us. Pt has appointment with Dr. Leary on 3/17. Family will let us know about where to receive treatment tomorrow (3/17).\par \par Also discuss PASS-01 trial that will become available later this week. Pancreatic Adenocarcinoma Signature Stratification for Treatment (PASS-01) Study is a potential option pending its opening hopefully this week\par \par This is a randomized multicentre phase II trial with a large translational component. The trial will evaluate the two standard chemotherapy regimens: modified FOLFIRINOX (mFFX) and gemcitabine/nab-paclitaxel (GA), in patients with untreated metastatic pancreatic ductal adenocarcinoma. Integrated into this phase II trial are a number of laboratory components including molecular profiling, patient derived organoid establishment, and drug testing sensitivity and other biomarkers.\par \par \par Detailed Description:\par The two chemotherapy regimens GA and mFFX remain standard treatment options without biomarkers to predict response. PASS-01 will for the first time explore progression free survival differences in the two standard backbone regimens used in the advanced setting. Biomarker driven strategies in pancreatic ductal adenocarcinoma (PDAC) are lacking, perhaps accounting for a large number of failed phase II studies. This study will evaluate two standard of care chemotherapy regimens, but will also explore high content molecular profiling, chemotherapy sensitivity signatures, GATA6 and other putative biomarkers as predictors of response to chemotherapy. In addition, the use of patient derived organoid models for personalized medicine in PDAC will continue to develop within this study.\par \par Approximately 150 patients diagnosed with untreated metastatic pancreatic cancer will be randomized to either arm.\par \par Outcome Measures\par ------------------------\par Primary Outcome Measures :\par Progression free survival(PFS) in mFFX and GA arms pancreatic ductal adenocarcinoma (PDAC) in a randomized phase II trial. [ Time Frame: 2-4 years ]\par Time from the date of randomization to progression based on the radiology assessment of response using RECIST v1.1, or death, whichever is earlier\par \par \par Secondary Outcome Measures :\par ALATORRE by RECIST 1.1 and duration of response in patients receiving mFFX or GA [ Time Frame: 2-4 years ]\par percentage of patient's measurable disease who have achieved either complete response (CR) or partial response (MT)\par \par Overall survival (OS) associated with mFFX or GA profiles, signatures and pharmacotyping [ Time Frame: 2-4 years ]\par GATA6 as a biomarker of response to mFFX or GA [ Time Frame: 2-4 years ]\par • Concordance between organoid transcriptomic profiles (RNAseq) and patient transcriptomic profiles (descriptive statistics) [ Time Frame: 2-4 years ]\par • Concordance between chemotherapy sensitivity signature predictions and response to first line treatment (descriptive statistics). [ Time Frame: 2-4 years ]\par • Correlation of individual tumour cytokeratins (eg. CK5 and CK17 expression) with chemotherapy response and resistance [ Time Frame: 2-4 years ]\par Cell free circulating tumor (ct) DNA analysis (including KRAS mutational status) [ Time Frame: 2-4 years ]\par Cluster Tendency analysis using artificial neural networks and radiomic methods combined [ Time Frame: 2-4 years ]\par \par \par 2-Diagnostic: \par ---------------\par Labs: Baseline Labs including coags, CEA, , lipase, amylase\par Scans: CT scans chest, \par Will get baseline PET scan\par \par 3- Pharmacogenetics testing:\par ----------------------------------\par - Plan to check for UGT1A1, DPYD and pharmacogenetics in anticipation for treatment with 5-FU and \par  Onivyde \par - Plan to order MSI for future PDL-1 therapy \par - Somatic and germline mutations per NCCN guidelines\par \par 4- Procedures:\par -----------------\par - Plan for Port-A-Cath placement if the patient decides to [place] [facility]\par - Hugh Cath: Will need hugh cath double lumen power port for chemo; Make sure that pt. is not on ASA but on lovenox BID as anticoagulation \par \par 5-Supportive: \par -----------------\par Pain: \par Pancreatic insufficiency: Will start CREON - [address] units (2 pills with each snack and 4 pills with each meal) for pancreatic replacement\par diabetes: pt already on insulin\par \par \par 6- RTC: \par --------- -After above tests performed \par \par . \par \par  \par Plan\par Malignant neoplasm of pancreas metastatic to liver \par Start: Morphine Sulfate ER 15 MG Oral Tablet Extended Release; TAKE 1 TABLET Every\par twelve hours MDD:2 tablets\par \par \par PASS-01 study: Pancreatic Adenocarcinoma Signature Stratification for Treatment was also mentioned though we are waiting on ite opening, expecting soon\par \par 3/30/21 Update:\par Patient will be cared for by Dr. Leary at this time. Will not seek care at [facility].

## 2021-04-05 LAB
DPYD GENOTYPE: NORMAL
DPYD PHENOTYPE: NORMAL
DPYD SPECIMEN: NORMAL
FULL GENE SEQUENCE RESULT: NORMAL
INTERPRETATION PGDFRB: NORMAL
Lab: NORMAL
REF LAB TEST METHOD: NORMAL
REVIEWED BY: NORMAL
TA REPEAT RESULT: NORMAL
TEST PERFORMANCE INFO SPEC: NORMAL

## 2023-10-01 PROBLEM — K86.89 PANCREATIC MASS: Status: ACTIVE | Noted: 2021-03-10

## 2024-01-29 NOTE — ED ADULT NURSE NOTE - EXTENSIONS OF SELF_ADULT
Spoke with mother and notified her MD will be at hospital at 1. Discussed with mother changing appt  time from 1 to 2. Mother agreed.    None